# Patient Record
Sex: MALE | Race: WHITE | NOT HISPANIC OR LATINO | Employment: UNEMPLOYED | URBAN - METROPOLITAN AREA
[De-identification: names, ages, dates, MRNs, and addresses within clinical notes are randomized per-mention and may not be internally consistent; named-entity substitution may affect disease eponyms.]

---
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
## 2019-10-03 ENCOUNTER — HEALTH MAINTENANCE LETTER (OUTPATIENT)
Age: 19
End: 2019-10-03

## 2020-11-07 ENCOUNTER — HEALTH MAINTENANCE LETTER (OUTPATIENT)
Age: 20
End: 2020-11-07

## 2021-09-05 ENCOUNTER — HEALTH MAINTENANCE LETTER (OUTPATIENT)
Age: 21
End: 2021-09-05

## 2021-12-26 ENCOUNTER — HEALTH MAINTENANCE LETTER (OUTPATIENT)
Age: 21
End: 2021-12-26

## 2025-05-09 ENCOUNTER — RESULTS FOLLOW-UP (OUTPATIENT)
Dept: FAMILY MEDICINE | Facility: CLINIC | Age: 25
End: 2025-05-09

## 2025-05-09 PROBLEM — R10.31 RLQ ABDOMINAL PAIN: Status: ACTIVE | Noted: 2025-05-09

## 2025-05-09 NOTE — RESULT ENCOUNTER NOTE
Pam Saldaña,     Here are my comments about your recent results:    -Cholesterol levels (LDL,HDL, Triglycerides) are normal. ADVISE: rechecking in 5 years.    -Kidney function is normal (Creatinine is barely elevated - this could be due to dehydration, GFR), Sodium is normal, Potassium is normal, Calcium is normal, Glucose is normal.     All great news! No concerns at this time.     For additional lab test information, labtestsonline.org is an excellent reference..    Please let us know if you have any questions or concerns.    If you have a question about the results, please use the ? (question fang) option at the upper right corner.     Regards,  YESSENIA Coughlin CNP

## 2025-06-16 ENCOUNTER — APPOINTMENT (OUTPATIENT)
Dept: GENERAL RADIOLOGY | Facility: CLINIC | Age: 25
End: 2025-06-16
Attending: FAMILY MEDICINE
Payer: COMMERCIAL

## 2025-06-16 ENCOUNTER — HOSPITAL ENCOUNTER (EMERGENCY)
Facility: CLINIC | Age: 25
Discharge: HOME | End: 2025-06-16
Attending: FAMILY MEDICINE | Admitting: FAMILY MEDICINE
Payer: COMMERCIAL

## 2025-06-16 VITALS
TEMPERATURE: 98.4 F | RESPIRATION RATE: 9 BRPM | DIASTOLIC BLOOD PRESSURE: 83 MMHG | WEIGHT: 241 LBS | HEART RATE: 70 BPM | OXYGEN SATURATION: 98 % | BODY MASS INDEX: 32.64 KG/M2 | HEIGHT: 72 IN | SYSTOLIC BLOOD PRESSURE: 136 MMHG

## 2025-06-16 DIAGNOSIS — R00.0 TACHYARRHYTHMIA: ICD-10-CM

## 2025-06-16 LAB
ALBUMIN SERPL BCG-MCNC: 4.6 G/DL (ref 3.5–5.2)
ALP SERPL-CCNC: 73 U/L (ref 40–150)
ALT SERPL W P-5'-P-CCNC: 26 U/L (ref 0–70)
ANION GAP SERPL CALCULATED.3IONS-SCNC: 11 MMOL/L (ref 7–15)
AST SERPL W P-5'-P-CCNC: 24 U/L (ref 0–45)
BASOPHILS # BLD AUTO: 0.1 10E3/UL (ref 0–0.2)
BASOPHILS NFR BLD AUTO: 1 %
BILIRUB SERPL-MCNC: 0.5 MG/DL
BUN SERPL-MCNC: 10.9 MG/DL (ref 6–20)
CALCIUM SERPL-MCNC: 9.7 MG/DL (ref 8.8–10.4)
CHLORIDE SERPL-SCNC: 104 MMOL/L (ref 98–107)
CREAT SERPL-MCNC: 1.15 MG/DL (ref 0.67–1.17)
D DIMER PPP FEU-MCNC: <0.27 UG/ML FEU (ref 0–0.5)
EGFRCR SERPLBLD CKD-EPI 2021: 90 ML/MIN/1.73M2
EOSINOPHIL # BLD AUTO: 0.5 10E3/UL (ref 0–0.7)
EOSINOPHIL NFR BLD AUTO: 5 %
ERYTHROCYTE [DISTWIDTH] IN BLOOD BY AUTOMATED COUNT: 12.5 % (ref 10–15)
GLUCOSE SERPL-MCNC: 96 MG/DL (ref 70–99)
HCO3 SERPL-SCNC: 25 MMOL/L (ref 22–29)
HCT VFR BLD AUTO: 44.6 % (ref 40–53)
HGB BLD-MCNC: 15.4 G/DL (ref 13.3–17.7)
IMM GRANULOCYTES # BLD: 0.1 10E3/UL
IMM GRANULOCYTES NFR BLD: 1 %
LYMPHOCYTES # BLD AUTO: 2.6 10E3/UL (ref 0.8–5.3)
LYMPHOCYTES NFR BLD AUTO: 27 %
MCH RBC QN AUTO: 30.3 PG (ref 26.5–33)
MCHC RBC AUTO-ENTMCNC: 34.5 G/DL (ref 31.5–36.5)
MCV RBC AUTO: 88 FL (ref 78–100)
MONOCYTES # BLD AUTO: 0.8 10E3/UL (ref 0–1.3)
MONOCYTES NFR BLD AUTO: 9 %
NEUTROPHILS # BLD AUTO: 5.7 10E3/UL (ref 1.6–8.3)
NEUTROPHILS NFR BLD AUTO: 59 %
NRBC # BLD AUTO: 0 10E3/UL
NRBC BLD AUTO-RTO: 0 /100
PLATELET # BLD AUTO: 223 10E3/UL (ref 150–450)
POTASSIUM SERPL-SCNC: 3.8 MMOL/L (ref 3.4–5.3)
PROT SERPL-MCNC: 7.5 G/DL (ref 6.4–8.3)
RBC # BLD AUTO: 5.09 10E6/UL (ref 4.4–5.9)
SODIUM SERPL-SCNC: 140 MMOL/L (ref 135–145)
TROPONIN T SERPL HS-MCNC: <6 NG/L
TSH SERPL DL<=0.005 MIU/L-ACNC: 2.78 UIU/ML (ref 0.3–4.2)
WBC # BLD AUTO: 9.7 10E3/UL (ref 4–11)

## 2025-06-16 PROCEDURE — 85004 AUTOMATED DIFF WBC COUNT: CPT | Performed by: FAMILY MEDICINE

## 2025-06-16 PROCEDURE — 93010 ELECTROCARDIOGRAM REPORT: CPT | Performed by: FAMILY MEDICINE

## 2025-06-16 PROCEDURE — 85004 AUTOMATED DIFF WBC COUNT: CPT | Performed by: EMERGENCY MEDICINE

## 2025-06-16 PROCEDURE — 84484 ASSAY OF TROPONIN QUANT: CPT | Performed by: FAMILY MEDICINE

## 2025-06-16 PROCEDURE — 85379 FIBRIN DEGRADATION QUANT: CPT | Performed by: FAMILY MEDICINE

## 2025-06-16 PROCEDURE — 99284 EMERGENCY DEPT VISIT MOD MDM: CPT | Performed by: FAMILY MEDICINE

## 2025-06-16 PROCEDURE — 93005 ELECTROCARDIOGRAM TRACING: CPT | Performed by: FAMILY MEDICINE

## 2025-06-16 PROCEDURE — 71046 X-RAY EXAM CHEST 2 VIEWS: CPT

## 2025-06-16 PROCEDURE — 99285 EMERGENCY DEPT VISIT HI MDM: CPT | Mod: 25 | Performed by: FAMILY MEDICINE

## 2025-06-16 PROCEDURE — 84443 ASSAY THYROID STIM HORMONE: CPT | Performed by: FAMILY MEDICINE

## 2025-06-16 PROCEDURE — 84155 ASSAY OF PROTEIN SERUM: CPT | Performed by: FAMILY MEDICINE

## 2025-06-16 PROCEDURE — 36415 COLL VENOUS BLD VENIPUNCTURE: CPT | Performed by: EMERGENCY MEDICINE

## 2025-06-16 PROCEDURE — 36415 COLL VENOUS BLD VENIPUNCTURE: CPT | Performed by: FAMILY MEDICINE

## 2025-06-16 ASSESSMENT — ACTIVITIES OF DAILY LIVING (ADL)
ADLS_ACUITY_SCORE: 41

## 2025-06-16 ASSESSMENT — COLUMBIA-SUICIDE SEVERITY RATING SCALE - C-SSRS
2. HAVE YOU ACTUALLY HAD ANY THOUGHTS OF KILLING YOURSELF IN THE PAST MONTH?: NO
6. HAVE YOU EVER DONE ANYTHING, STARTED TO DO ANYTHING, OR PREPARED TO DO ANYTHING TO END YOUR LIFE?: NO
1. IN THE PAST MONTH, HAVE YOU WISHED YOU WERE DEAD OR WISHED YOU COULD GO TO SLEEP AND NOT WAKE UP?: NO

## 2025-06-16 NOTE — ED NOTES
States he felt palpitations first at onset while at work today-trying to decrease his caffeine intake recently, switched from energy drinks to coffee. No hx of arrhythmias, states he felt sweaty & dizzy when symptoms started-resolved now.

## 2025-06-16 NOTE — DISCHARGE INSTRUCTIONS
I placed an order for a Zio patch monitor to be applied.  I have also placed an order for primary care follow-up to review the results.  If the rapid heartbeat recurs and does not immediately resolve, call 911 and come to the emergency department.

## 2025-06-16 NOTE — ED PROVIDER NOTES
History     Chief Complaint   Patient presents with    Tachycardia     Felt sensation in his chest and his watch said his heart rate went up to 160; has not dropped below 100 since then.      MARLIN Melgar is a 25 year old male who comes in via private car from work having had an episode of tachycardia.  He was doing some light work activities when he started a pressure in his epigastrium and then had a kind of full body flushing sensation followed by a racing heart.  His Apple Watch told him his heart rate was 160.  He is not capable of doing an ECG.  This lasted 1 to 2 minutes and resolved.  During that time he felt a bit lightheaded nauseated and slightly short of breath without any chest pain.  He has had no prior episodes of this.  He drinks alcohol perhaps a week he has never had episodes of this before.  He has no identified chronic medical problems.  He denies symptoms of recent illness.  He has not any cough, cold, fever, flu symptoms.  He has not had any ankle edema.  He has occasional calf pains including perhaps some right calf pain this morning no history of DVT or PE    Allergies:  No Known Allergies    Problem List:    Patient Active Problem List    Diagnosis Date Noted    RLQ abdominal pain 05/09/2025     Priority: Medium    Therapeutic drug monitoring 05/09/2013     Priority: Medium    Acne vulgaris 05/09/2013     Priority: Medium        Past Medical History:    Past Medical History:   Diagnosis Date    Acne vulgaris     Photosensitive contact dermatitis        Past Surgical History:    No past surgical history on file.    Family History:    Family History   Problem Relation Age of Onset    Cancer Maternal Grandmother     Breast Cancer Maternal Grandmother     Melanoma Maternal Grandfather     Breast Cancer Paternal Grandmother     Cancer Paternal Grandfather         skin    Diabetes Paternal Grandfather     Prostate Cancer Paternal Grandfather     Asthma No family hx of     C.A.D. No family  hx of     Hypertension No family hx of     Cerebrovascular Disease No family hx of     Cancer - colorectal No family hx of     Thyroid Disease No family hx of     Lupus No family hx of     Thrombophilia No family hx of     Psoriasis No family hx of     Eczema No family hx of        Social History:  Marital Status:  Single [1]  Social History     Tobacco Use    Smoking status: Former     Current packs/day: 0.00     Types: Other, Cigarettes     Quit date: 3/1/2016     Years since quittin.2    Smokeless tobacco: Former     Quit date: 2025    Tobacco comments:     no exposure   Vaping Use    Vaping status: Every Day    Substances: Nicotine   Substance Use Topics    Alcohol use: Yes    Drug use: No        Medications:    No current outpatient medications on file.        Review of Systems  All other systems are reviewed and are negative    Physical Exam   BP: (!) 152/95  Pulse: 87  Temp: 98.4  F (36.9  C)  Resp: 16  Height: 182.9 cm (6')  Weight: 109.3 kg (241 lb)  SpO2: 100 %      Physical Exam  Nursing note and vitals were reviewed.  Constitutional: Awake and alert, adequately nourished and developed appearing 25-year-old in no apparent discomfort, who does not appear acutely ill, and who answers questions appropriately and cooperates with examination.  HEENT: Speech fluent. Voice quality normal. PERRL. EOMI.   Neck: Freely mobile.  Cardiovascular: Cardiac examination reveals normal heart rate and regular rhythm without murmur.  Pulmonary/Chest: Breathing is unlabored.  Breath sounds are clear and equal bilaterally.  There no retractions, tachypnea, rales, wheezes, or rhonchi.  Musculoskeletal: Extremities are warm and well-perfused and without edema  Neurological: Alert, oriented, thought content logical, coherent   Skin: Warm, dry, no rashes.  Psychiatric: Affect broad and appropriate.    ED Course        Procedures              EKG Interpretation:      Interpreted by Bryce Estevez MD  Time reviewed:  14:40  Symptoms at time of EKG: none   Rhythm: normal sinus   Rate: normal  Axis: normal  Ectopy: none  Conduction: normal  ST Segments/ T Waves: No ST-T wave changes  Q Waves: none  Comparison to prior: No old EKG available    Clinical Impression: normal EKG      Critical Care time:  none     None         Results for orders placed or performed during the hospital encounter of 06/16/25 (from the past 24 hours)   EKG 12-lead, tracing only   Result Value Ref Range    Systolic Blood Pressure  mmHg    Diastolic Blood Pressure  mmHg    Ventricular Rate 66 BPM    Atrial Rate 66 BPM    CO Interval 152 ms    QRS Duration 118 ms     ms    QTc 400 ms    P Axis 66 degrees    R AXIS 74 degrees    T Axis 40 degrees    Interpretation ECG       Sinus rhythm  Non-specific intra-ventricular conduction delay  Borderline ECG  No previous ECGs available     CBC with platelets, differential    Narrative    The following orders were created for panel order CBC with platelets, differential.  Procedure                               Abnormality         Status                     ---------                               -----------         ------                     CBC with platelets and ...[[id number]]                      Final result                 Please view results for these tests on the individual orders.   Comprehensive metabolic panel   Result Value Ref Range    Sodium 140 135 - 145 mmol/L    Potassium 3.8 3.4 - 5.3 mmol/L    Carbon Dioxide (CO2) 25 22 - 29 mmol/L    Anion Gap 11 7 - 15 mmol/L    Urea Nitrogen 10.9 6.0 - 20.0 mg/dL    Creatinine 1.15 0.67 - 1.17 mg/dL    GFR Estimate 90 >60 mL/min/1.73m2    Calcium 9.7 8.8 - 10.4 mg/dL    Chloride 104 98 - 107 mmol/L    Glucose 96 70 - 99 mg/dL    Alkaline Phosphatase 73 40 - 150 U/L    AST 24 0 - 45 U/L    ALT 26 0 - 70 U/L    Protein Total 7.5 6.4 - 8.3 g/dL    Albumin 4.6 3.5 - 5.2 g/dL    Bilirubin Total 0.5 <=1.2 mg/dL   CBC with platelets and differential   Result Value  Ref Range    WBC Count 9.7 4.0 - 11.0 10e3/uL    RBC Count 5.09 4.40 - 5.90 10e6/uL    Hemoglobin 15.4 13.3 - 17.7 g/dL    Hematocrit 44.6 40.0 - 53.0 %    MCV 88 78 - 100 fL    MCH 30.3 26.5 - 33.0 pg    MCHC 34.5 31.5 - 36.5 g/dL    RDW 12.5 10.0 - 15.0 %    Platelet Count 223 150 - 450 10e3/uL    % Neutrophils 59 %    % Lymphocytes 27 %    % Monocytes 9 %    % Eosinophils 5 %    % Basophils 1 %    % Immature Granulocytes 1 %    NRBCs per 100 WBC 0 <1 /100    Absolute Neutrophils 5.7 1.6 - 8.3 10e3/uL    Absolute Lymphocytes 2.6 0.8 - 5.3 10e3/uL    Absolute Monocytes 0.8 0.0 - 1.3 10e3/uL    Absolute Eosinophils 0.5 0.0 - 0.7 10e3/uL    Absolute Basophils 0.1 0.0 - 0.2 10e3/uL    Absolute Immature Granulocytes 0.1 <=0.4 10e3/uL    Absolute NRBCs 0.0 10e3/uL   TSH with free T4 reflex   Result Value Ref Range    TSH 2.78 0.30 - 4.20 uIU/mL   Troponin T, High Sensitivity   Result Value Ref Range    Troponin T, High Sensitivity <6 <=22 ng/L   D dimer quantitative   Result Value Ref Range    D-Dimer Quantitative <0.27 0.00 - 0.50 ug/mL FEU    Narrative    This D-dimer assay is intended for use in conjunction with a clinical pretest probability assessment model to exclude pulmonary embolism (PE) and deep venous thrombosis (DVT) in outpatients suspected of PE or DVT. The cut-off value is 0.50 ug/mL FEU.   XR Chest 2 Views    Narrative    EXAM: XR CHEST 2 VIEWS  LOCATION: [facility]  DATE: 6/16/2025    INDICATION: tachycardia  COMPARISON: None.      Impression    IMPRESSION: Negative chest.       Medications - No data to display    Assessments & Plan (with Medical Decision Making)     25-year-old male presented having had an episode of 1 to 2 minutes of tachycardia with his Apple Watch indicating a heart rate around he has not had it previously and has not had it while in the emergency department being monitored over 3 hours.  His CBC is normal with no evidence of anemia causing this.  His  thyroid testing is normal with no evidence of hyperthyroidism.  Troponin is normal and D-dimer is 0 with no concern for PE as a trigger.  Blood chemistries are normal with no evidence of an electrolyte or abnormality causing this.  His EKG is normal.  I discussed with him most likely this is either an episode of SVT or atrial fibrillation and we need to capture it on ECG monitoring to make the diagnosis.  I placed an order for Zio patch monitoring and he will come in to have this placed.  I placed an order for primary care referral to schedule follow-up to review the results.  I advised him that if the symptoms recur and do not resolve immediately it is best to call 911 so they can do an ECG and help make the diagnosis and that he should avoid driving while this is occurring and come to the emergency department either via ED someone to drive him.     I have reviewed the nursing notes.    I have reviewed the findings, diagnosis, plan and need for follow up with the patient.         New Prescriptions    No medications on file       Final diagnoses:   Tachyarrhythmia       6/16/2025   [facility] EMERGENCY DEPT       Bryce Estevez MD  06/16/25 0874

## 2025-06-16 NOTE — ED TRIAGE NOTES
Felt sensation in his chest and his watch said his heart rate went up to 160; has not dropped below 100 since then.      Triage Assessment (Adult)       Row Name 06/16/25 4842          Triage Assessment    Airway WDL WDL        Skin Circulation/Temperature WDL    Skin Circulation/Temperature WDL WDL        Cardiac WDL    Cardiac WDL X        Cognitive/Neuro/Behavioral WDL    Cognitive/Neuro/Behavioral WDL WDL                      Pt verbalizes understanding of discharge instructions, no further questions, no acute distress noted. Vitals WNL.

## 2025-06-17 ENCOUNTER — ALLIED HEALTH/NURSE VISIT (OUTPATIENT)
Dept: CARDIOLOGY | Facility: CLINIC | Age: 25
End: 2025-06-17
Attending: FAMILY MEDICINE
Payer: COMMERCIAL

## 2025-06-17 DIAGNOSIS — R00.0 TACHYARRHYTHMIA: ICD-10-CM

## 2025-06-17 NOTE — PROGRESS NOTES
Piotr Melgar arrived here on 6/17/2025 1:12 PM for 8-14 Days  Zio monitor placement per ordering provider Bryce Estevez  for the diagnosis tachyarrhythmia.  Patient s skin was prepped per protocol. Dr. Cruz is the supervising MD.  Zio monitor was placed.  Instructions were reviewed with and given to the patient.  Patient verbalized understanding of wear, troubleshooting and monitor return instructions.

## 2025-06-19 LAB
ATRIAL RATE - MUSE: 66 BPM
DIASTOLIC BLOOD PRESSURE - MUSE: NORMAL MMHG
INTERPRETATION ECG - MUSE: NORMAL
P AXIS - MUSE: 66 DEGREES
PR INTERVAL - MUSE: 152 MS
QRS DURATION - MUSE: 118 MS
QT - MUSE: 382 MS
QTC - MUSE: 400 MS
R AXIS - MUSE: 74 DEGREES
SYSTOLIC BLOOD PRESSURE - MUSE: NORMAL MMHG
T AXIS - MUSE: 40 DEGREES
VENTRICULAR RATE- MUSE: 66 BPM

## 2025-07-09 LAB — CV ZIO PRELIM RESULTS: NORMAL

## 2025-07-28 ENCOUNTER — OFFICE VISIT (OUTPATIENT)
Dept: FAMILY MEDICINE | Facility: CLINIC | Age: 25
End: 2025-07-28
Payer: COMMERCIAL

## 2025-07-28 VITALS
HEIGHT: 71 IN | OXYGEN SATURATION: 97 % | HEART RATE: 55 BPM | DIASTOLIC BLOOD PRESSURE: 100 MMHG | BODY MASS INDEX: 36.26 KG/M2 | WEIGHT: 259 LBS | SYSTOLIC BLOOD PRESSURE: 142 MMHG | TEMPERATURE: 98.3 F | RESPIRATION RATE: 16 BRPM

## 2025-07-28 DIAGNOSIS — Z09 HOSPITAL DISCHARGE FOLLOW-UP: Primary | ICD-10-CM

## 2025-07-28 DIAGNOSIS — F41.9 ANXIETY: ICD-10-CM

## 2025-07-28 PROBLEM — R10.31 RLQ ABDOMINAL PAIN: Status: RESOLVED | Noted: 2025-05-09 | Resolved: 2025-07-28

## 2025-07-28 PROCEDURE — 99213 OFFICE O/P EST LOW 20 MIN: CPT

## 2025-07-28 PROCEDURE — 3080F DIAST BP >= 90 MM HG: CPT

## 2025-07-28 PROCEDURE — 1126F AMNT PAIN NOTED NONE PRSNT: CPT

## 2025-07-28 PROCEDURE — 3077F SYST BP >= 140 MM HG: CPT

## 2025-07-28 RX ORDER — HYDROXYZINE HYDROCHLORIDE 10 MG/1
10 TABLET, FILM COATED ORAL 3 TIMES DAILY PRN
Qty: 60 TABLET | Refills: 0 | Status: SHIPPED | OUTPATIENT
Start: 2025-07-28

## 2025-07-28 ASSESSMENT — PAIN SCALES - GENERAL: PAINLEVEL_OUTOF10: NO PAIN (0)

## 2025-07-28 NOTE — PROGRESS NOTES
Assessment & Plan     Hospital discharge follow-up  Patient was seen in the emergency department on 6/16/2025 for tachyarrhythmia.  Considered POTS syndrome and completed orthostatic blood pressure and pulse in clinic with no abnormal findings    Anxiety  Patient reports he may have anxiety and struggles with possible anxiety attacks.  Patient provided hydroxyzine 10 mg tablets as needed for anxiety.  Patient education regarding appropriate times to take medication.  Patient advised to follow-up in clinic hydroxyzine 3-5 times per week for 3 or 4-week weeks in a row.    - hydrOXYzine HCl (ATARAX) 10 MG tablet; Take 1 tablet (10 mg) by mouth 3 times daily as needed for anxiety.  MED REC REQUIRED  Post Medication Reconciliation Status:     Hema Saldaña is a 25 year old, presenting for the following health issues:  Results (Of zio patch -He has not received results yet--  was in ER 6/16), Chest Pain (On and off), and ER F/U (From 6/16 )        7/28/2025     3:37 PM   Additional Questions   Roomed by Ernesto DUBOSE CMA   Accompanied by self     Chief Complaint   Patient presents with    Results     Of zio patch -He has not received results yet--  was in ER 6/16    Chest Pain     On and off    ER F/U     From 6/16        25-year-old male presents with hospital follow-up.  Patient was seen in the emergency department on 6/16/2025 for tachyarrhythmia.  Patient reports he has not used nicotine or vaped since Emergency Department visit.  Reviewed Zio patch findings with patient.  Patient reports he has not had an episode since emergency department visit patient denies shortness of breath, chest pain, and heart palpitations.  Patient was shown to blow into a syringe to help with a vasovagal maneuver.  Patient reported maneuver helped with cardiac concerns.  Patient denies taking any supplements or energy drinks.  Patient reports he is cut out energy drinks and cut down on caffeine.  Considered POTS syndrome, orthostatic blood  pressure and pulse were taken with no abnormal findings.  Patient reports he struggles with possible OCD symptoms with locking doors at night.  Discussed anxiety with patient and started on hydroxyzine as needed for anxiety.  Patient advised to follow-up in clinic if he is taking hydroxyzine 3-5 times per week 4 to 5 weeks in a row.    History of Present Illness       Reason for visit:  Go over zio patch results and address symptoms not related such as chest pain    He eats 2-3 servings of fruits and vegetables daily.He consumes 1 sweetened beverage(s) daily.He exercises with enough effort to increase his heart rate 30 to 60 minutes per day.  He exercises with enough effort to increase his heart rate 5 days per week.   He is taking medications regularly.          Chest Pain  Onset/Duration: x 6/16( seen in ER)  Description:   Location: left side  Character: sharp  Radiation: none   Duration: seconds    Intensity: moderate  Progression of Symptoms: improving  Accompanying Signs & Symptoms:  Shortness of breath: YES- occ.   Sweating: No  Nausea/vomiting: YES- nausea  Lightheadedness: No  Palpitations: No  Fever/Chills: No  Cough: No           Heartburn: YES  History:   Family history of heart disease: unsure   Tobacco use: YES- former vap- nicotine , stopped 6/16  Previous similar symptoms: YES  Precipitating factors:   Worse with exertion: No  Worse with deep breaths: No           Related to eating: No           Better with burping: No  Alleviating factors: stretching   Therapies tried and outcome: stretching helps  ED/UC Followup:    Facility:  [facility]  Date of visit: 6/16/25   Reason for visit: tachycardia   Current Status: chest pain comes and goes, last seconds , one episode that felt like it could be similar to one he had 6/16, then stopped. Here to discuss Zio patch results     Review of Systems  CONSTITUTIONAL: NEGATIVE for fever, chills, change in weight  ENT/MOUTH: NEGATIVE for ear, mouth and  "throat problems  RESP: NEGATIVE for significant cough or SOB  CV: NEGATIVE for chest pain, palpitations or peripheral edema      Objective    BP (!) 142/100 (BP Location: Right arm, Patient Position: Sitting, Cuff Size: Adult Regular)   Pulse 55   Temp 98.3  F (36.8  C) (Tympanic)   Resp 16   Ht 1.803 m (5' 11\")   Wt 117.5 kg (259 lb)   SpO2 97%   BMI 36.12 kg/m    Body mass index is 36.12 kg/m .    Physical Exam   GENERAL: alert and no distress  NECK: no adenopathy, no asymmetry, masses, or scars  RESP: lungs clear to auscultation - no rales, rhonchi or wheezes  CV: regular rate and rhythm, normal S1 S2, no S3 or S4, no murmur, click or rub, no peripheral edema  MS: no gross musculoskeletal defects noted, no edema        Signed Electronically by: YESSENIA Coughlin CNP    "